# Patient Record
Sex: FEMALE | Race: BLACK OR AFRICAN AMERICAN | NOT HISPANIC OR LATINO | URBAN - METROPOLITAN AREA
[De-identification: names, ages, dates, MRNs, and addresses within clinical notes are randomized per-mention and may not be internally consistent; named-entity substitution may affect disease eponyms.]

---

## 2020-07-25 ENCOUNTER — TELEPHONE ENCOUNTER (OUTPATIENT)
Dept: URBAN - METROPOLITAN AREA CLINIC 13 | Facility: CLINIC | Age: 45
End: 2020-07-25

## 2020-07-25 RX ORDER — DICYCLOMINE HYDROCHLORIDE 10 MG/1
TAKE 1 CAPSULE EVERY 6 HOURS AS NEEDED CAPSULE ORAL
Qty: 28 | Refills: 0 | OUTPATIENT
Start: 2018-05-30 | End: 2018-12-06

## 2020-07-25 RX ORDER — POLYETHYLENE GLYCOL 3350, SODIUM CHLORIDE, SODIUM BICARBONATE AND POTASSIUM CHLORIDE WITH LEMON FLAVOR 420; 11.2; 5.72; 1.48 G/4L; G/4L; G/4L; G/4L
USE AS DIRECTED POWDER, FOR SOLUTION ORAL
Qty: 1 | Refills: 0 | OUTPATIENT
Start: 2018-05-30 | End: 2018-07-09

## 2020-07-25 RX ORDER — HYOSCYAMINE SULFATE 0.12 MG/1
TAKE ONE TABLET BY MOUTH EVERY 6 TO 8 HOURS AS NEEDED TABLET ORAL
Qty: 50 | Refills: 1 | OUTPATIENT
Start: 2014-10-30 | End: 2018-03-19

## 2020-07-25 RX ORDER — TOPIRAMATE 50 MG/1
TAKE 1 TABLET TWICE DAILY TABLET, FILM COATED ORAL
Refills: 0 | OUTPATIENT
End: 2018-03-19

## 2020-07-25 RX ORDER — HYOSCYAMINE SULFATE 0.12 MG/1
PLACE 1 TABLET EVERY 4-6 HOURS PRN ABDOMINAL PAIN TABLET, ORALLY DISINTEGRATING ORAL
Qty: 60 | Refills: 2 | OUTPATIENT
Start: 2014-03-13 | End: 2014-10-30

## 2020-07-25 RX ORDER — NAPROXEN 500 MG/1
TAKE 1 TABLET TWICE DAILY AFTER MEALS AS NEEDED TABLET ORAL
Refills: 0 | OUTPATIENT
End: 2014-10-30

## 2020-07-25 RX ORDER — CHOLESTYRAMINE 4 G/9G
MIX THE CONTENTS OF 1 POWDER PACKET WITH 2-6 OZ OF NONCARBONATED BEVERAGE AND SWALLOW TWICE DAILY POWDER, FOR SUSPENSION ORAL
Qty: 60 | Refills: 6 | OUTPATIENT
Start: 2013-11-25 | End: 2014-10-30

## 2020-07-25 RX ORDER — LEVETIRACETAM 500 MG/1
TAKE 1 TABLET TWICE DAILY TABLET ORAL
Refills: 0 | OUTPATIENT
End: 2015-03-17

## 2020-07-25 RX ORDER — DIPHENOXYLATE HYDROCHLORIDE AND ATROPINE SULFATE 2.5; .025 MG/1; MG/1
TAKE TABLET 3 TIMES DAILY PRN TABLET ORAL
Qty: 60 | Refills: 2 | OUTPATIENT
Start: 2013-11-25 | End: 2014-03-13

## 2020-07-25 RX ORDER — MULTIVITAMIN
TAKE 2 CAPSULE DAILY CAPSULE ORAL
Refills: 0 | OUTPATIENT
End: 2015-03-17

## 2020-07-25 RX ORDER — RIFAXIMIN 550 MG/1
TAKE 1 TABLET TWICE DAILY TABLET ORAL
Qty: 28 | Refills: 1 | OUTPATIENT
Start: 2014-03-13 | End: 2014-05-06

## 2020-07-25 RX ORDER — METRONIDAZOLE 500 MG/1
TAKE 1 TABLET 3 TIMES DAILY FOR 10 DAYS TABLET ORAL
Qty: 30 | Refills: 0 | OUTPATIENT
Start: 2013-12-31 | End: 2014-03-13

## 2020-07-26 ENCOUNTER — TELEPHONE ENCOUNTER (OUTPATIENT)
Dept: URBAN - METROPOLITAN AREA CLINIC 13 | Facility: CLINIC | Age: 45
End: 2020-07-26

## 2020-07-26 RX ORDER — ONABOTULINUMTOXINA 100 [USP'U]/1
INJECT ML  1 INJECTION EVERY 3 MONTHS INJECTION, POWDER, LYOPHILIZED, FOR SOLUTION INTRADERMAL; INTRAMUSCULAR
Refills: 0 | Status: ACTIVE | COMMUNITY

## 2020-07-26 RX ORDER — DICYCLOMINE HYDROCHLORIDE 20 MG/1
TABLET ORAL
Qty: 30 | Refills: 0 | Status: ACTIVE | COMMUNITY
Start: 2018-08-27

## 2020-07-26 RX ORDER — VENLAFAXINE HYDROCHLORIDE 75 MG/1
CAPSULE, EXTENDED RELEASE ORAL
Qty: 30 | Refills: 0 | Status: ACTIVE | COMMUNITY
Start: 2019-05-01

## 2020-07-26 RX ORDER — AMITRIPTYLINE HYDROCHLORIDE 25 MG/1
TAKE 1 TABLET AT BEDTIME TABLET, FILM COATED ORAL
Qty: 30 | Refills: 6 | Status: ACTIVE | COMMUNITY
Start: 2016-05-09

## 2020-07-26 RX ORDER — APIXABAN 2.5 MG/1
TAKE TABLET TWICE DAILY TABLET, FILM COATED ORAL
Refills: 0 | Status: ACTIVE | COMMUNITY

## 2020-07-26 RX ORDER — MENTHOL, METHYL SALICYLATE 10; 15 G/100G; G/100G
CREAM TOPICAL
Qty: 85 | Refills: 0 | Status: ACTIVE | COMMUNITY
Start: 2018-04-30

## 2020-07-26 RX ORDER — ESTRADIOL 10 UG/1
TABLET VAGINAL
Qty: 90 | Refills: 0 | Status: ACTIVE | COMMUNITY
Start: 2018-11-05

## 2020-07-26 RX ORDER — FLUCONAZOLE 150 MG/1
TABLET ORAL
Qty: 1 | Refills: 0 | Status: ACTIVE | COMMUNITY
Start: 2019-03-01

## 2020-07-26 RX ORDER — CETIRIZINE HYDROCHLORIDE 10 MG/1
TABLET, FILM COATED ORAL
Qty: 90 | Refills: 0 | Status: ACTIVE | COMMUNITY
Start: 2019-05-23

## 2020-07-26 RX ORDER — FLUTICASONE PROPIONATE 110 UG/1
AEROSOL, METERED RESPIRATORY (INHALATION)
Qty: 10 | Refills: 0 | Status: ACTIVE | COMMUNITY
Start: 2017-09-13

## 2020-07-26 RX ORDER — FEXOFENADINE HCL 180 MG/1
TAKE 1 TABLET TWICE DAILY TABLET ORAL
Refills: 0 | Status: ACTIVE | COMMUNITY

## 2020-07-26 RX ORDER — VALACYCLOVIR HYDROCHLORIDE 500 MG/1
TABLET, FILM COATED ORAL
Qty: 10 | Refills: 0 | Status: ACTIVE | COMMUNITY
Start: 2019-05-23

## 2020-07-26 RX ORDER — FLUCONAZOLE 150 MG/1
TABLET ORAL
Qty: 2 | Refills: 0 | Status: ACTIVE | COMMUNITY
Start: 2018-05-31

## 2020-07-26 RX ORDER — VENLAFAXINE HYDROCHLORIDE 75 MG/1
CAPSULE, EXTENDED RELEASE ORAL
Qty: 30 | Refills: 0 | Status: ACTIVE | COMMUNITY
Start: 2019-05-23

## 2020-07-26 RX ORDER — ELETRIPTAN HYDROBROMIDE 40 MG/1
TAKE 1 TABLET DAILY PRN TABLET, FILM COATED ORAL
Refills: 0 | Status: ACTIVE | COMMUNITY

## 2020-07-26 RX ORDER — FLUCONAZOLE 150 MG/1
TABLET ORAL
Qty: 1 | Refills: 0 | Status: ACTIVE | COMMUNITY
Start: 2018-02-26

## 2020-07-26 RX ORDER — ACETAMINOPHEN AND CODEINE PHOSPHATE 300; 30 MG/1; MG/1
TABLET ORAL
Qty: 20 | Refills: 0 | Status: ACTIVE | COMMUNITY
Start: 2018-12-19

## 2020-07-26 RX ORDER — MUPIROCIN 20 MG/G
OINTMENT TOPICAL
Qty: 22 | Refills: 0 | Status: ACTIVE | COMMUNITY
Start: 2018-06-28

## 2020-07-26 RX ORDER — MENTHOL, METHYL SALICYLATE 10; 15 G/100G; G/100G
CREAM TOPICAL
Qty: 85 | Refills: 0 | Status: ACTIVE | COMMUNITY
Start: 2018-12-19

## 2020-07-26 RX ORDER — VALACYCLOVIR HYDROCHLORIDE 500 MG/1
TABLET, FILM COATED ORAL
Qty: 6 | Refills: 0 | Status: ACTIVE | COMMUNITY
Start: 2018-02-03

## 2020-07-26 RX ORDER — ERENUMAB-AOOE 70 MG/ML
INJECT MG  UAD INJECTION SUBCUTANEOUS
Refills: 0 | Status: ACTIVE | COMMUNITY
Start: 2018-10-18

## 2020-07-26 RX ORDER — DICYCLOMINE HYDROCHLORIDE 10 MG/1
TAKE 1 CAPSULE 3 TIMES DAILY PRN CAPSULE ORAL
Qty: 60 | Refills: 3 | Status: ACTIVE | COMMUNITY
Start: 2018-08-27

## 2020-07-26 RX ORDER — BENZOCAINE AND MENTHOL 15; 3.6 MG/1; MG/1
LOZENGE ORAL
Qty: 18 | Refills: 0 | Status: ACTIVE | COMMUNITY
Start: 2019-05-23

## 2020-07-26 RX ORDER — MELOXICAM 15 MG/1
TABLET ORAL
Qty: 15 | Refills: 0 | Status: ACTIVE | COMMUNITY
Start: 2018-04-17

## 2020-07-26 RX ORDER — MINOCYCLINE HYDROCHLORIDE 50 MG/1
CAPSULE ORAL
Qty: 40 | Refills: 0 | Status: ACTIVE | COMMUNITY
Start: 2018-01-30

## 2020-07-26 RX ORDER — NITROFURANTOIN MONOHYDRATE/MACROCRYSTALLINE 25; 75 MG/1; MG/1
CAPSULE ORAL
Qty: 10 | Refills: 0 | Status: ACTIVE | COMMUNITY
Start: 2019-05-30

## 2020-07-26 RX ORDER — CLINDAMYCIN HYDROCHLORIDE 300 MG/1
CAPSULE ORAL
Qty: 30 | Refills: 0 | Status: ACTIVE | COMMUNITY
Start: 2019-03-01

## 2020-07-26 RX ORDER — OSELTAMIVIR PHOSPHATE 75 MG/1
CAPSULE ORAL
Qty: 10 | Refills: 0 | Status: ACTIVE | COMMUNITY
Start: 2018-01-31

## 2020-07-26 RX ORDER — ONDANSETRON 4 MG/1
TABLET, ORALLY DISINTEGRATING ORAL
Qty: 9 | Refills: 0 | Status: ACTIVE | COMMUNITY
Start: 2018-01-31

## 2020-09-14 ENCOUNTER — OFFICE VISIT (OUTPATIENT)
Dept: URBAN - METROPOLITAN AREA CLINIC 113 | Facility: CLINIC | Age: 45
End: 2020-09-14
Payer: OTHER GOVERNMENT

## 2020-09-14 VITALS
HEIGHT: 65 IN | WEIGHT: 135 LBS | HEART RATE: 68 BPM | TEMPERATURE: 98 F | SYSTOLIC BLOOD PRESSURE: 126 MMHG | DIASTOLIC BLOOD PRESSURE: 88 MMHG | BODY MASS INDEX: 22.49 KG/M2

## 2020-09-14 DIAGNOSIS — K58.0 IRRITABLE BOWEL SYNDROME WITH DIARRHEA: ICD-10-CM

## 2020-09-14 PROCEDURE — 1036F TOBACCO NON-USER: CPT | Performed by: INTERNAL MEDICINE

## 2020-09-14 PROCEDURE — 99214 OFFICE O/P EST MOD 30 MIN: CPT | Performed by: INTERNAL MEDICINE

## 2020-09-14 PROCEDURE — G8427 DOCREV CUR MEDS BY ELIG CLIN: HCPCS | Performed by: INTERNAL MEDICINE

## 2020-09-14 PROCEDURE — G8420 CALC BMI NORM PARAMETERS: HCPCS | Performed by: INTERNAL MEDICINE

## 2020-09-14 RX ORDER — ELETRIPTAN HYDROBROMIDE 40 MG/1
TAKE 1 TABLET DAILY PRN TABLET, FILM COATED ORAL
Refills: 0 | Status: ON HOLD | COMMUNITY

## 2020-09-14 RX ORDER — AMITRIPTYLINE HYDROCHLORIDE 25 MG/1
TAKE 1 TABLET AT BEDTIME TABLET, FILM COATED ORAL
Qty: 30 | Refills: 6 | Status: ACTIVE | COMMUNITY
Start: 2016-05-09

## 2020-09-14 RX ORDER — AMITRIPTYLINE HYDROCHLORIDE 25 MG/1
TAKE 1 TABLET AT BEDTIME TABLET, FILM COATED ORAL ONCE A DAY
Qty: 90 | Refills: 3

## 2020-09-14 RX ORDER — VENLAFAXINE HYDROCHLORIDE 75 MG/1
CAPSULE, EXTENDED RELEASE ORAL
Qty: 30 | Refills: 0 | COMMUNITY
Start: 2019-05-01

## 2020-09-14 RX ORDER — NITROFURANTOIN MONOHYDRATE/MACROCRYSTALLINE 25; 75 MG/1; MG/1
CAPSULE ORAL
Qty: 10 | Refills: 0 | Status: ON HOLD | COMMUNITY
Start: 2019-05-30

## 2020-09-14 RX ORDER — ESTRADIOL 10 UG/1
TABLET VAGINAL
Qty: 90 | Refills: 0 | COMMUNITY
Start: 2018-11-05

## 2020-09-14 RX ORDER — VALACYCLOVIR HYDROCHLORIDE 500 MG/1
TABLET, FILM COATED ORAL
Qty: 6 | Refills: 0 | COMMUNITY
Start: 2018-02-03

## 2020-09-14 RX ORDER — MUPIROCIN 20 MG/G
OINTMENT TOPICAL
Qty: 22 | Refills: 0 | COMMUNITY
Start: 2018-06-28

## 2020-09-14 RX ORDER — MINOCYCLINE HYDROCHLORIDE 50 MG/1
CAPSULE ORAL
Qty: 40 | Refills: 0 | Status: ON HOLD | COMMUNITY
Start: 2018-01-30

## 2020-09-14 RX ORDER — ERENUMAB-AOOE 70 MG/ML
INJECT MG  UAD INJECTION SUBCUTANEOUS
Refills: 0 | COMMUNITY
Start: 2018-10-18

## 2020-09-14 RX ORDER — OSELTAMIVIR PHOSPHATE 75 MG/1
CAPSULE ORAL
Qty: 10 | Refills: 0 | Status: ON HOLD | COMMUNITY
Start: 2018-01-31

## 2020-09-14 RX ORDER — FEXOFENADINE HCL 180 MG/1
TAKE 1 TABLET TWICE DAILY TABLET ORAL
Refills: 0 | Status: ON HOLD | COMMUNITY

## 2020-09-14 RX ORDER — APIXABAN 2.5 MG/1
TAKE TABLET TWICE DAILY TABLET, FILM COATED ORAL
Refills: 0 | Status: ACTIVE | COMMUNITY

## 2020-09-14 RX ORDER — ONDANSETRON 4 MG/1
TABLET, ORALLY DISINTEGRATING ORAL
Qty: 9 | Refills: 0 | Status: ON HOLD | COMMUNITY
Start: 2018-01-31

## 2020-09-14 RX ORDER — MENTHOL, METHYL SALICYLATE 10; 15 G/100G; G/100G
CREAM TOPICAL
Qty: 85 | Refills: 0 | COMMUNITY
Start: 2018-04-30

## 2020-09-14 RX ORDER — LORATADINE 10 MG
1 PACKET MIXED WITH 8 OUNCES OF FLUID TABLET,DISINTEGRATING ORAL ONCE A DAY
Qty: 30 | Refills: 3 | OUTPATIENT
Start: 2020-09-14 | End: 2021-01-11

## 2020-09-14 RX ORDER — ACETAMINOPHEN AND CODEINE PHOSPHATE 300; 30 MG/1; MG/1
TABLET ORAL
Qty: 20 | Refills: 0 | Status: ON HOLD | COMMUNITY
Start: 2018-12-19

## 2020-09-14 RX ORDER — FLUCONAZOLE 150 MG/1
TABLET ORAL
Qty: 1 | Refills: 0 | Status: ON HOLD | COMMUNITY
Start: 2018-02-26

## 2020-09-14 RX ORDER — DICYCLOMINE HYDROCHLORIDE 10 MG/1
TAKE 1 CAPSULE 3 TIMES DAILY PRN CAPSULE ORAL
Qty: 60 | Refills: 3 | Status: ACTIVE | COMMUNITY
Start: 2018-08-27

## 2020-09-14 RX ORDER — FLUTICASONE PROPIONATE 110 UG/1
AEROSOL, METERED RESPIRATORY (INHALATION)
Qty: 10 | Refills: 0 | Status: ON HOLD | COMMUNITY
Start: 2017-09-13

## 2020-09-14 RX ORDER — CETIRIZINE HYDROCHLORIDE 10 MG/1
TABLET, FILM COATED ORAL
Qty: 90 | Refills: 0 | Status: ON HOLD | COMMUNITY
Start: 2019-05-23

## 2020-09-14 RX ORDER — CLINDAMYCIN HYDROCHLORIDE 300 MG/1
CAPSULE ORAL
Qty: 30 | Refills: 0 | Status: ON HOLD | COMMUNITY
Start: 2019-03-01

## 2020-09-14 RX ORDER — ONABOTULINUMTOXINA 100 [USP'U]/1
INJECT ML  1 INJECTION EVERY 3 MONTHS INJECTION, POWDER, LYOPHILIZED, FOR SOLUTION INTRADERMAL; INTRAMUSCULAR
Refills: 0 | Status: ON HOLD | COMMUNITY

## 2020-09-14 RX ORDER — MELOXICAM 15 MG/1
TABLET ORAL
Qty: 15 | Refills: 0 | COMMUNITY
Start: 2018-04-17

## 2020-09-14 RX ORDER — DICYCLOMINE HYDROCHLORIDE 10 MG/1
TAKE 1 CAPSULE 3 TIMES DAILY PRN CAPSULE ORAL
OUTPATIENT
Start: 2018-08-27

## 2020-09-14 RX ORDER — BENZOCAINE AND MENTHOL 15; 3.6 MG/1; MG/1
LOZENGE ORAL
Qty: 18 | Refills: 0 | Status: ON HOLD | COMMUNITY
Start: 2019-05-23

## 2020-09-14 RX ORDER — DICYCLOMINE HYDROCHLORIDE 20 MG/1
TABLET ORAL
Qty: 30 | Refills: 0 | Status: ON HOLD | COMMUNITY
Start: 2018-08-27

## 2020-09-14 NOTE — PHYSICAL EXAM GASTROINTESTINAL
Abdomen,  soft, diffusely tender without rebound or guarding, nondistended,  no rebound or guarding,  no masses palpable,  normal bowel sounds,  Liver and Spleen,  no hepatomegaly present,  no splenomegaly.

## 2020-09-14 NOTE — HPI-TODAY'S VISIT:
Patient returns today in follow-up.  She has a history of irritable bowel syndrome.  She reports she is been having worsening pain symptoms recently.  She was in the ER twice in the last month.  She had a CT scan and lab work apparently done at [facility].  I do not have those results.  She states that there was some kind of "blockage".  She was told to take MiraLAX.  She denies any fevers or chills.  No blood in the stool.  Pain is crampy but diffuse.  Seems to be more constant.  She has not been taking her amitriptyline.  She has been taking the Bentyl since her ER visit.  She was told to take MiraLAX although has not been using it consistently.  Patient with irritable bowel symptoms.  Needs to be on daily MiraLAX

## 2020-11-05 ENCOUNTER — OFFICE VISIT (OUTPATIENT)
Dept: URBAN - METROPOLITAN AREA CLINIC 113 | Facility: CLINIC | Age: 45
End: 2020-11-05
Payer: OTHER GOVERNMENT

## 2020-11-05 VITALS
WEIGHT: 136 LBS | BODY MASS INDEX: 22.66 KG/M2 | HEART RATE: 90 BPM | RESPIRATION RATE: 18 BRPM | SYSTOLIC BLOOD PRESSURE: 118 MMHG | HEIGHT: 65 IN | DIASTOLIC BLOOD PRESSURE: 82 MMHG | TEMPERATURE: 97.9 F

## 2020-11-05 DIAGNOSIS — K58.0 IRRITABLE BOWEL SYNDROME WITH DIARRHEA: ICD-10-CM

## 2020-11-05 PROCEDURE — 1036F TOBACCO NON-USER: CPT | Performed by: INTERNAL MEDICINE

## 2020-11-05 PROCEDURE — G8427 DOCREV CUR MEDS BY ELIG CLIN: HCPCS | Performed by: INTERNAL MEDICINE

## 2020-11-05 PROCEDURE — G8420 CALC BMI NORM PARAMETERS: HCPCS | Performed by: INTERNAL MEDICINE

## 2020-11-05 PROCEDURE — 99213 OFFICE O/P EST LOW 20 MIN: CPT | Performed by: INTERNAL MEDICINE

## 2020-11-05 PROCEDURE — G8483 FLU IMM NO ADMIN DOC REA: HCPCS | Performed by: INTERNAL MEDICINE

## 2020-11-05 RX ORDER — MINOCYCLINE HYDROCHLORIDE 50 MG/1
CAPSULE ORAL
Qty: 40 | Refills: 0 | Status: DISCONTINUED | COMMUNITY
Start: 2018-01-30

## 2020-11-05 RX ORDER — OSELTAMIVIR PHOSPHATE 75 MG/1
CAPSULE ORAL
Qty: 10 | Refills: 0 | Status: DISCONTINUED | COMMUNITY
Start: 2018-01-31

## 2020-11-05 RX ORDER — DICYCLOMINE HYDROCHLORIDE 10 MG/1
TAKE 1 CAPSULE 3 TIMES DAILY PRN CAPSULE ORAL
Status: ACTIVE | COMMUNITY
Start: 2018-08-27

## 2020-11-05 RX ORDER — CLINDAMYCIN HYDROCHLORIDE 300 MG/1
CAPSULE ORAL
Qty: 30 | Refills: 0 | Status: DISCONTINUED | COMMUNITY
Start: 2019-03-01

## 2020-11-05 RX ORDER — ONDANSETRON 4 MG/1
TABLET, ORALLY DISINTEGRATING ORAL
Qty: 9 | Refills: 0 | Status: DISCONTINUED | COMMUNITY
Start: 2018-01-31

## 2020-11-05 RX ORDER — ONABOTULINUMTOXINA 100 [USP'U]/1
INJECT ML  1 INJECTION EVERY 3 MONTHS INJECTION, POWDER, LYOPHILIZED, FOR SOLUTION INTRADERMAL; INTRAMUSCULAR
Refills: 0 | Status: DISCONTINUED | COMMUNITY

## 2020-11-05 RX ORDER — AMITRIPTYLINE HYDROCHLORIDE 25 MG/1
TAKE 1 TABLET AT BEDTIME TABLET, FILM COATED ORAL ONCE A DAY
Qty: 90 | Refills: 3 | Status: ACTIVE | COMMUNITY

## 2020-11-05 RX ORDER — MENTHOL, METHYL SALICYLATE 10; 15 G/100G; G/100G
CREAM TOPICAL
Qty: 85 | Refills: 0 | Status: DISCONTINUED | COMMUNITY
Start: 2018-04-30

## 2020-11-05 RX ORDER — LORATADINE 10 MG
1 PACKET MIXED WITH 8 OUNCES OF FLUID TABLET,DISINTEGRATING ORAL ONCE A DAY
Qty: 30 | Refills: 3 | Status: ACTIVE | COMMUNITY
Start: 2020-09-14 | End: 2021-01-11

## 2020-11-05 RX ORDER — ESTRADIOL 10 UG/1
TABLET VAGINAL
Qty: 90 | Refills: 0 | Status: DISCONTINUED | COMMUNITY
Start: 2018-11-05

## 2020-11-05 RX ORDER — NITROFURANTOIN MONOHYDRATE/MACROCRYSTALLINE 25; 75 MG/1; MG/1
CAPSULE ORAL
Qty: 10 | Refills: 0 | Status: DISCONTINUED | COMMUNITY
Start: 2019-05-30

## 2020-11-05 RX ORDER — AMITRIPTYLINE HYDROCHLORIDE 25 MG/1
TAKE 1 TABLET AT BEDTIME TABLET, FILM COATED ORAL ONCE A DAY
OUTPATIENT

## 2020-11-05 RX ORDER — DICYCLOMINE HYDROCHLORIDE 20 MG/1
TABLET ORAL
Qty: 30 | Refills: 0 | Status: DISCONTINUED | COMMUNITY
Start: 2018-08-27

## 2020-11-05 RX ORDER — FLUTICASONE PROPIONATE 110 UG/1
AEROSOL, METERED RESPIRATORY (INHALATION)
Qty: 10 | Refills: 0 | Status: DISCONTINUED | COMMUNITY
Start: 2017-09-13

## 2020-11-05 RX ORDER — ERENUMAB-AOOE 70 MG/ML
INJECT MG  UAD INJECTION SUBCUTANEOUS
Refills: 0 | Status: ACTIVE | COMMUNITY
Start: 2018-10-18

## 2020-11-05 RX ORDER — APIXABAN 2.5 MG/1
TAKE TABLET TWICE DAILY TABLET, FILM COATED ORAL
Refills: 0 | Status: ACTIVE | COMMUNITY

## 2020-11-05 RX ORDER — VENLAFAXINE HYDROCHLORIDE 75 MG/1
CAPSULE, EXTENDED RELEASE ORAL
Qty: 30 | Refills: 0 | Status: DISCONTINUED | COMMUNITY
Start: 2019-05-01

## 2020-11-05 RX ORDER — ELETRIPTAN HYDROBROMIDE 40 MG/1
TAKE 1 TABLET DAILY PRN TABLET, FILM COATED ORAL
Refills: 0 | Status: DISCONTINUED | COMMUNITY

## 2020-11-05 RX ORDER — LORATADINE 10 MG
1 PACKET MIXED WITH 8 OUNCES OF FLUID TABLET,DISINTEGRATING ORAL ONCE A DAY
OUTPATIENT
Start: 2020-09-14 | End: 2021-01-11

## 2020-11-05 RX ORDER — VALACYCLOVIR HYDROCHLORIDE 500 MG/1
TABLET, FILM COATED ORAL
Qty: 6 | Refills: 0 | Status: DISCONTINUED | COMMUNITY
Start: 2018-02-03

## 2020-11-05 RX ORDER — ACETAMINOPHEN AND CODEINE PHOSPHATE 300; 30 MG/1; MG/1
TABLET ORAL
Qty: 20 | Refills: 0 | Status: DISCONTINUED | COMMUNITY
Start: 2018-12-19

## 2020-11-05 RX ORDER — FEXOFENADINE HCL 180 MG/1
TAKE 1 TABLET TWICE DAILY TABLET ORAL
Refills: 0 | Status: DISCONTINUED | COMMUNITY

## 2020-11-05 RX ORDER — BENZOCAINE AND MENTHOL 15; 3.6 MG/1; MG/1
LOZENGE ORAL
Qty: 18 | Refills: 0 | Status: DISCONTINUED | COMMUNITY
Start: 2019-05-23

## 2020-11-05 RX ORDER — MUPIROCIN 20 MG/G
OINTMENT TOPICAL
Qty: 22 | Refills: 0 | Status: DISCONTINUED | COMMUNITY
Start: 2018-06-28

## 2020-11-05 RX ORDER — FLUCONAZOLE 150 MG/1
TABLET ORAL
Qty: 1 | Refills: 0 | Status: DISCONTINUED | COMMUNITY
Start: 2018-02-26

## 2020-11-05 RX ORDER — MELOXICAM 15 MG/1
TABLET ORAL
Qty: 15 | Refills: 0 | Status: DISCONTINUED | COMMUNITY
Start: 2018-04-17

## 2020-11-05 RX ORDER — CETIRIZINE HYDROCHLORIDE 10 MG/1
TABLET, FILM COATED ORAL
Qty: 90 | Refills: 0 | Status: DISCONTINUED | COMMUNITY
Start: 2019-05-23

## 2020-11-05 RX ORDER — DICYCLOMINE HYDROCHLORIDE 10 MG/1
TAKE 1 CAPSULE 3 TIMES DAILY PRN CAPSULE ORAL
OUTPATIENT
Start: 2018-08-27

## 2020-11-05 NOTE — HPI-TODAY'S VISIT:
Patient returns today in follow-up.  She reports she is doing much better.  She was started on amitriptyline on her last visit.  Her pain is better.  She is needing Bentyl very infrequently.  She is having more regular bowel movements by using MiraLAX daily.  Generally has 1 or 2 bowel movements per day.  Not hard.  No straining.  Denies any fevers or chills.  No other new complaints

## 2021-03-05 ENCOUNTER — OFFICE VISIT (OUTPATIENT)
Dept: URBAN - METROPOLITAN AREA CLINIC 113 | Facility: CLINIC | Age: 46
End: 2021-03-05

## 2021-03-15 ENCOUNTER — OFFICE VISIT (OUTPATIENT)
Dept: URBAN - METROPOLITAN AREA CLINIC 107 | Facility: CLINIC | Age: 46
End: 2021-03-15
Payer: OTHER GOVERNMENT

## 2021-03-15 VITALS
WEIGHT: 140 LBS | DIASTOLIC BLOOD PRESSURE: 72 MMHG | SYSTOLIC BLOOD PRESSURE: 114 MMHG | HEIGHT: 65 IN | OXYGEN SATURATION: 98 % | TEMPERATURE: 98.3 F | HEART RATE: 98 BPM | BODY MASS INDEX: 23.32 KG/M2

## 2021-03-15 DIAGNOSIS — K58.0 IRRITABLE BOWEL SYNDROME WITH DIARRHEA: ICD-10-CM

## 2021-03-15 PROCEDURE — 99213 OFFICE O/P EST LOW 20 MIN: CPT | Performed by: NURSE PRACTITIONER

## 2021-03-15 RX ORDER — ERENUMAB-AOOE 70 MG/ML
INJECT MG  UAD INJECTION SUBCUTANEOUS
Refills: 0 | Status: ACTIVE | COMMUNITY
Start: 2018-10-18

## 2021-03-15 RX ORDER — APIXABAN 2.5 MG/1
TAKE TABLET TWICE DAILY TABLET, FILM COATED ORAL
Refills: 0 | Status: ACTIVE | COMMUNITY

## 2021-03-15 RX ORDER — AMITRIPTYLINE HYDROCHLORIDE 25 MG/1
TAKE 1 TABLET AT BEDTIME TABLET, FILM COATED ORAL ONCE A DAY
OUTPATIENT

## 2021-03-15 RX ORDER — DICYCLOMINE HYDROCHLORIDE 10 MG/1
TAKE 1 CAPSULE 3 TIMES DAILY PRN CAPSULE ORAL
Status: ACTIVE | COMMUNITY
Start: 2018-08-27

## 2021-03-15 RX ORDER — AMITRIPTYLINE HYDROCHLORIDE 25 MG/1
TAKE 1 TABLET AT BEDTIME TABLET, FILM COATED ORAL ONCE A DAY
Status: ACTIVE | COMMUNITY

## 2021-03-15 NOTE — HPI-TODAY'S VISIT:
This is a 45-year-old woman with a history of irritable bowel syndrome with diarrhea and abdominal pain, presenting for follow-up. She was last seen in the office on 11/5/2020 in follow-up for irritable bowel syndrome and abdominal pain.  She had been started on amitriptyline 25 mg daily at bedtime with significant improvement in her symptoms.  Her bowels were moving fairly regularly using MiraLAX daily, and required dicyclomine infrequently.  She was recommended to continue amitriptyline for 3 months and then stop with plans to follow-up 1 month after stopping this to see if it would be needed long-term. She reports drowsiness lasting over half of the day associated with using amitriptyline at bedtime.  She did not stop this as previously recommended.  Abdominal pain remains unchanged, only occurring dietary indiscretions such as cabbage.  Bowels are moving daily without blood per rectum.  Appetite is fair, weight is stable.

## 2021-04-26 ENCOUNTER — OFFICE VISIT (OUTPATIENT)
Dept: URBAN - METROPOLITAN AREA CLINIC 107 | Facility: CLINIC | Age: 46
End: 2021-04-26

## 2021-04-26 RX ORDER — APIXABAN 2.5 MG/1
TAKE TABLET TWICE DAILY TABLET, FILM COATED ORAL
Refills: 0 | Status: ACTIVE | COMMUNITY

## 2021-04-26 RX ORDER — DICYCLOMINE HYDROCHLORIDE 10 MG/1
TAKE 1 CAPSULE 3 TIMES DAILY PRN CAPSULE ORAL
Status: ACTIVE | COMMUNITY
Start: 2018-08-27

## 2021-04-26 RX ORDER — ERENUMAB-AOOE 70 MG/ML
INJECT MG  UAD INJECTION SUBCUTANEOUS
Refills: 0 | Status: ACTIVE | COMMUNITY
Start: 2018-10-18

## 2021-04-26 NOTE — HPI-TODAY'S VISIT:
45-year-old woman with a history of irritable bowel syndrome with diarrhea and abdominal pain, presenting for 6-week follow-up. She was last seen in the office on 3/15/2021.  She was without any symptoms regarding IBS or abdominal pain.  She did not stop amitriptyline as previously recommended so she was encouraged to stop that medication at that visit.  She was planned to see back in the office in 6 weeks to determine long-term need for amitriptyline.  Given her complaints of drowsiness, resumption of amitriptyline at a lower dose was to be considered.

## 2021-05-27 ENCOUNTER — OFFICE VISIT (OUTPATIENT)
Dept: URBAN - METROPOLITAN AREA CLINIC 113 | Facility: CLINIC | Age: 46
End: 2021-05-27

## 2021-06-18 ENCOUNTER — OFFICE VISIT (OUTPATIENT)
Dept: URBAN - METROPOLITAN AREA CLINIC 113 | Facility: CLINIC | Age: 46
End: 2021-06-18
Payer: OTHER GOVERNMENT

## 2021-06-18 VITALS
WEIGHT: 142 LBS | SYSTOLIC BLOOD PRESSURE: 121 MMHG | DIASTOLIC BLOOD PRESSURE: 86 MMHG | RESPIRATION RATE: 20 BRPM | TEMPERATURE: 98 F | HEIGHT: 65 IN | BODY MASS INDEX: 23.66 KG/M2 | HEART RATE: 81 BPM

## 2021-06-18 DIAGNOSIS — K58.0 IRRITABLE BOWEL SYNDROME WITH DIARRHEA: ICD-10-CM

## 2021-06-18 PROCEDURE — 99213 OFFICE O/P EST LOW 20 MIN: CPT | Performed by: INTERNAL MEDICINE

## 2021-06-18 RX ORDER — ERENUMAB-AOOE 70 MG/ML
INJECT MG  UAD INJECTION SUBCUTANEOUS
Refills: 0 | Status: ACTIVE | COMMUNITY
Start: 2018-10-18

## 2021-06-18 RX ORDER — APIXABAN 2.5 MG/1
TAKE TABLET TWICE DAILY TABLET, FILM COATED ORAL
Refills: 0 | Status: ACTIVE | COMMUNITY

## 2021-06-18 RX ORDER — DICYCLOMINE HYDROCHLORIDE 10 MG/1
TAKE 1 CAPSULE 3 TIMES DAILY PRN CAPSULE ORAL
Status: ACTIVE | COMMUNITY
Start: 2018-08-27

## 2021-06-18 RX ORDER — AMITRIPTYLINE HYDROCHLORIDE 10 MG/1
1 TABLET AT BEDTIME TABLET, FILM COATED ORAL ONCE A DAY
Qty: 90 | Refills: 3 | OUTPATIENT
Start: 2021-06-18

## 2021-06-18 NOTE — HPI-TODAY'S VISIT:
46-year-old female with a past medical history of irritable bowel syndrome with diarrhea and abdominal pain, presenting for follow-up. She was last seen in the office on 3/15/2021.  She reported drowsiness lasting over half of the day associated with use of amitriptyline at bedtime.  She had not stopped this as previously recommended.  Her abdominal pain remained unchanged, only occurring with dietary indiscretion such as eating cabbage.  Her weight was stable.  She was recommended to stop amitriptyline to determine need for long-term use of medication.  Given her complaints of drowsiness, resumption at lower dose was a consideration.  She reports feeling "so-so." Over the last couple of weeks, she has been having increased abdominal cramping and diarrhea. Cramping is located in the lower abdomen. This is interfering with sleep.  She was certainly better on amitriptyline. No nausea or vomiting. No blood per rectum. Dicyclomine helps with cramping when used. She is not on faiber supplement. She is supposed to be taking Metamucil but is not. She has not tried Imodium.

## 2021-09-27 ENCOUNTER — OFFICE VISIT (OUTPATIENT)
Dept: URBAN - METROPOLITAN AREA CLINIC 113 | Facility: CLINIC | Age: 46
End: 2021-09-27

## 2021-11-23 ENCOUNTER — OFFICE VISIT (OUTPATIENT)
Dept: URBAN - METROPOLITAN AREA CLINIC 113 | Facility: CLINIC | Age: 46
End: 2021-11-23
Payer: OTHER GOVERNMENT

## 2021-11-23 VITALS
DIASTOLIC BLOOD PRESSURE: 80 MMHG | BODY MASS INDEX: 23.82 KG/M2 | HEART RATE: 89 BPM | TEMPERATURE: 97.5 F | HEIGHT: 65 IN | SYSTOLIC BLOOD PRESSURE: 115 MMHG | WEIGHT: 143 LBS

## 2021-11-23 DIAGNOSIS — K58.0 IRRITABLE BOWEL SYNDROME WITH DIARRHEA: ICD-10-CM

## 2021-11-23 PROCEDURE — 99213 OFFICE O/P EST LOW 20 MIN: CPT | Performed by: INTERNAL MEDICINE

## 2021-11-23 RX ORDER — ERENUMAB-AOOE 70 MG/ML
INJECT MG  UAD INJECTION SUBCUTANEOUS
Refills: 0 | Status: ACTIVE | COMMUNITY
Start: 2018-10-18

## 2021-11-23 RX ORDER — DICYCLOMINE HYDROCHLORIDE 10 MG/1
TAKE 1 CAPSULE 3 TIMES DAILY PRN CAPSULE ORAL
Status: ACTIVE | COMMUNITY
Start: 2018-08-27

## 2021-11-23 RX ORDER — DICYCLOMINE HYDROCHLORIDE 10 MG/1
TAKE 1 CAPSULE 3 TIMES DAILY PRN CAPSULE ORAL
OUTPATIENT
Start: 2018-08-27

## 2021-11-23 RX ORDER — APIXABAN 2.5 MG/1
TAKE TABLET TWICE DAILY TABLET, FILM COATED ORAL
Refills: 0 | Status: ACTIVE | COMMUNITY

## 2021-11-23 RX ORDER — AMITRIPTYLINE HYDROCHLORIDE 10 MG/1
1 TABLET AT BEDTIME TABLET, FILM COATED ORAL ONCE A DAY
Qty: 90 | Refills: 3 | Status: ACTIVE | COMMUNITY
Start: 2021-06-18

## 2021-11-23 RX ORDER — AMITRIPTYLINE HYDROCHLORIDE 10 MG/1
1 TABLET AT BEDTIME TABLET, FILM COATED ORAL ONCE A DAY
OUTPATIENT
Start: 2021-06-18

## 2021-11-23 NOTE — HPI-TODAY'S VISIT:
Patient turns today in follow-up.  She is doing well.  Amitriptyline has been helpful.  She denies any chest pain or shortness of breath.  Little abdominal pain.  Improved with Bentyl.  Generally has 2 bowel movements per day.  Occasionally will have some worsening symptoms when she indulges with certain foods.

## 2022-07-14 ENCOUNTER — OFFICE VISIT (OUTPATIENT)
Dept: URBAN - METROPOLITAN AREA CLINIC 113 | Facility: CLINIC | Age: 47
End: 2022-07-14
Payer: OTHER GOVERNMENT

## 2022-07-14 VITALS
HEART RATE: 69 BPM | SYSTOLIC BLOOD PRESSURE: 133 MMHG | TEMPERATURE: 97.8 F | DIASTOLIC BLOOD PRESSURE: 96 MMHG | WEIGHT: 145 LBS | BODY MASS INDEX: 24.16 KG/M2 | HEIGHT: 65 IN

## 2022-07-14 DIAGNOSIS — K58.0 IRRITABLE BOWEL SYNDROME WITH DIARRHEA: ICD-10-CM

## 2022-07-14 DIAGNOSIS — R10.30 LOWER ABDOMINAL PAIN: ICD-10-CM

## 2022-07-14 PROCEDURE — 99214 OFFICE O/P EST MOD 30 MIN: CPT | Performed by: INTERNAL MEDICINE

## 2022-07-14 RX ORDER — ERENUMAB-AOOE 70 MG/ML
INJECT MG  UAD INJECTION SUBCUTANEOUS
Refills: 0 | Status: ACTIVE | COMMUNITY
Start: 2018-10-18

## 2022-07-14 RX ORDER — AMITRIPTYLINE HYDROCHLORIDE 10 MG/1
1 TABLET AT BEDTIME TABLET, FILM COATED ORAL ONCE A DAY
Status: ACTIVE | COMMUNITY
Start: 2021-06-18

## 2022-07-14 RX ORDER — APIXABAN 2.5 MG/1
TAKE TABLET TWICE DAILY TABLET, FILM COATED ORAL
Refills: 0 | Status: ACTIVE | COMMUNITY

## 2022-07-14 RX ORDER — DICYCLOMINE HYDROCHLORIDE 10 MG/1
TAKE 1 CAPSULE 3 TIMES DAILY PRN CAPSULE ORAL
Status: ON HOLD | COMMUNITY
Start: 2018-08-27

## 2022-07-14 RX ORDER — RIFAXIMIN 550 MG/1
1 TABLET TABLET ORAL THREE TIMES A DAY
Qty: 42 TABLET | Refills: 0 | OUTPATIENT
Start: 2022-07-14 | End: 2022-07-28

## 2022-07-14 NOTE — HPI-OTHER HISTORIES
Colonoscopy 7/9/2018: Performed without difficulty. BBPS score of 6. Patent end-to-end colo colonic anastamosis with healthy appearing mucosa. Otherwise normal and no specimens were collected. Repeat in 10 years.

## 2022-07-14 NOTE — HPI-TODAY'S VISIT:
47 year old female with a history of endometriosis, rectal laceration during hysterectomy requiring partial colectomy, and IBS-D presents for evaluation of abdominal pain. She was last seen on 11/23/2021 for followup regarding IBS. Symptoms were well controlled with Amitriptyline and Dicyclomine as needed. She was recommended to follow-up in 6 months. She has been lost to follow up ever since.  Patient reports worsening IBS symptoms for the last 3-4 months. She has daily abdominal cramping. She is having up to 5 loose stools daily. She is no longer on Benefiber as she did not like the taste and texture when sprinkled over her food. She has not tried mixing it into her drink. She does have colestipol at home which helps however causes constipation for three days even after one tablet. However, she forgets to take it before she eats. It does cause constipation at times. She stopped taking Bentyl as it interfered with her seizure medication.   She did travel to the [facility] in June on a cruise however her diarrhea was ongoing prior to this. She denies recent abx use or hospitalization. Denies sick contacts. SHe denies blood per rectum or melena.

## 2022-07-15 ENCOUNTER — OFFICE VISIT (OUTPATIENT)
Dept: URBAN - METROPOLITAN AREA CLINIC 107 | Facility: CLINIC | Age: 47
End: 2022-07-15

## 2022-09-26 ENCOUNTER — OFFICE VISIT (OUTPATIENT)
Dept: URBAN - METROPOLITAN AREA CLINIC 113 | Facility: CLINIC | Age: 47
End: 2022-09-26
Payer: OTHER GOVERNMENT

## 2022-09-26 ENCOUNTER — LAB OUTSIDE AN ENCOUNTER (OUTPATIENT)
Dept: URBAN - METROPOLITAN AREA CLINIC 113 | Facility: CLINIC | Age: 47
End: 2022-09-26

## 2022-09-26 VITALS
RESPIRATION RATE: 20 BRPM | TEMPERATURE: 98 F | SYSTOLIC BLOOD PRESSURE: 105 MMHG | HEIGHT: 65 IN | DIASTOLIC BLOOD PRESSURE: 74 MMHG | WEIGHT: 148 LBS | HEART RATE: 88 BPM | BODY MASS INDEX: 24.66 KG/M2

## 2022-09-26 DIAGNOSIS — K58.0 IRRITABLE BOWEL SYNDROME WITH DIARRHEA: ICD-10-CM

## 2022-09-26 DIAGNOSIS — R10.84 GENERALIZED ABDOMINAL PAIN: ICD-10-CM

## 2022-09-26 PROCEDURE — 99214 OFFICE O/P EST MOD 30 MIN: CPT | Performed by: INTERNAL MEDICINE

## 2022-09-26 RX ORDER — ERENUMAB-AOOE 70 MG/ML
INJECT MG  UAD INJECTION SUBCUTANEOUS
Refills: 0 | Status: ACTIVE | COMMUNITY
Start: 2018-10-18

## 2022-09-26 RX ORDER — APIXABAN 2.5 MG/1
TAKE TABLET TWICE DAILY TABLET, FILM COATED ORAL
Refills: 0 | Status: ACTIVE | COMMUNITY

## 2022-09-26 RX ORDER — RIFAXIMIN 550 MG/1
1 TABLET TABLET ORAL THREE TIMES A DAY
Qty: 42 TABLET | Refills: 1 | OUTPATIENT
Start: 2022-09-26 | End: 2022-10-24

## 2022-09-26 RX ORDER — AMITRIPTYLINE HYDROCHLORIDE 10 MG/1
1 TABLET AT BEDTIME TABLET, FILM COATED ORAL ONCE A DAY
Status: ACTIVE | COMMUNITY
Start: 2021-06-18

## 2022-09-26 RX ORDER — DICYCLOMINE HYDROCHLORIDE 10 MG/1
TAKE 1 CAPSULE 3 TIMES DAILY PRN CAPSULE ORAL
Status: ON HOLD | COMMUNITY
Start: 2018-08-27

## 2022-09-26 RX ORDER — AMITRIPTYLINE HYDROCHLORIDE 10 MG/1
1 TABLET AT BEDTIME TABLET, FILM COATED ORAL ONCE A DAY
OUTPATIENT
Start: 2021-06-18

## 2022-09-26 NOTE — HPI-TODAY'S VISIT:
Patient presents today in follow-up.  She has a history of irritable bowel syndrome diarrhea predominant as well as abdominal pain.  She was given Xifaxan at her last visit but was not able to get it filled as they did not have it on post.  She reports alternating bowel movement still.  She has abdominal pain which seems to be more postprandial in nature.  Without being said she is not losing weight.  There is no blood in the stool.  She is unable to take dicyclomine secondary to interactions with her seizure medication.  She is on amitriptyline at bedtime.  She takes colestipol as needed for loose stools.  She denies any jaundice or dark urine.  No other precipitating factors noted.

## 2022-09-27 LAB
A/G RATIO: 1.5
ABSOLUTE BASOPHILS: 59
ABSOLUTE EOSINOPHILS: 151
ABSOLUTE LYMPHOCYTES: 2020
ABSOLUTE MONOCYTES: 529
ABSOLUTE NEUTROPHILS: 2641
ALBUMIN: 4.1
ALKALINE PHOSPHATASE: 105
ALT (SGPT): 10
AMYLASE: 46
AST (SGOT): 15
BASOPHILS: 1.1
BILIRUBIN, TOTAL: 0.7
BUN/CREATININE RATIO: (no result)
BUN: 9
CALCIUM: 9.3
CARBON DIOXIDE, TOTAL: 26
CHLORIDE: 105
CREATININE: 0.79
EGFR: 94
EOSINOPHILS: 2.8
GLOBULIN, TOTAL: 2.8
GLUCOSE: 85
HEMATOCRIT: 38.2
HEMOGLOBIN: 12.3
LIPASE: 25
LYMPHOCYTES: 37.4
MCH: 28.1
MCHC: 32.2
MCV: 87.4
MONOCYTES: 9.8
MPV: 10.6
NEUTROPHILS: 48.9
PLATELET COUNT: 290
POTASSIUM: 3.5
PROTEIN, TOTAL: 6.9
RDW: 13.5
RED BLOOD CELL COUNT: 4.37
SODIUM: 141
WHITE BLOOD CELL COUNT: 5.4

## 2022-10-24 ENCOUNTER — TELEPHONE ENCOUNTER (OUTPATIENT)
Dept: URBAN - METROPOLITAN AREA CLINIC 113 | Facility: CLINIC | Age: 47
End: 2022-10-24

## 2022-12-01 ENCOUNTER — OFFICE VISIT (OUTPATIENT)
Dept: URBAN - METROPOLITAN AREA CLINIC 113 | Facility: CLINIC | Age: 47
End: 2022-12-01
Payer: OTHER GOVERNMENT

## 2022-12-01 VITALS
BODY MASS INDEX: 24.32 KG/M2 | DIASTOLIC BLOOD PRESSURE: 82 MMHG | TEMPERATURE: 97.5 F | HEART RATE: 94 BPM | WEIGHT: 146 LBS | HEIGHT: 65 IN | SYSTOLIC BLOOD PRESSURE: 109 MMHG

## 2022-12-01 DIAGNOSIS — R10.84 GENERALIZED ABDOMINAL PAIN: ICD-10-CM

## 2022-12-01 DIAGNOSIS — K58.0 IRRITABLE BOWEL SYNDROME WITH DIARRHEA: ICD-10-CM

## 2022-12-01 PROBLEM — 197125005 IRRITABLE BOWEL SYNDROME WITH DIARRHEA: Status: ACTIVE | Noted: 2020-09-14

## 2022-12-01 PROCEDURE — 99214 OFFICE O/P EST MOD 30 MIN: CPT | Performed by: INTERNAL MEDICINE

## 2022-12-01 RX ORDER — AMITRIPTYLINE HYDROCHLORIDE 10 MG/1
1 TABLET AT BEDTIME TABLET, FILM COATED ORAL ONCE A DAY
Status: ACTIVE | COMMUNITY
Start: 2021-06-18

## 2022-12-01 RX ORDER — ERENUMAB-AOOE 70 MG/ML
INJECT MG  UAD INJECTION SUBCUTANEOUS
Refills: 0 | Status: ACTIVE | COMMUNITY
Start: 2018-10-18

## 2022-12-01 RX ORDER — DICYCLOMINE HYDROCHLORIDE 10 MG/1
TAKE 1 CAPSULE 3 TIMES DAILY PRN CAPSULE ORAL
OUTPATIENT
Start: 2018-08-27

## 2022-12-01 RX ORDER — DICYCLOMINE HYDROCHLORIDE 10 MG/1
TAKE 1 CAPSULE 3 TIMES DAILY PRN CAPSULE ORAL
Status: ACTIVE | COMMUNITY
Start: 2018-08-27

## 2022-12-01 RX ORDER — APIXABAN 2.5 MG/1
TAKE TABLET TWICE DAILY TABLET, FILM COATED ORAL
Refills: 0 | Status: ACTIVE | COMMUNITY

## 2022-12-01 RX ORDER — AMITRIPTYLINE HYDROCHLORIDE 10 MG/1
1 TABLET AT BEDTIME TABLET, FILM COATED ORAL ONCE A DAY
OUTPATIENT
Start: 2021-06-18

## 2022-12-01 NOTE — HPI-TODAY'S VISIT:
Patient returns today in follow-up.  She reports that she is doing well.  Her symptoms are fairly well controlled.  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  She uses dicyclomine as needed.  She is taking the amitriptyline daily.  Overall symptom control seems improved.  She was unable to get the Xifaxan secondary to some insurance issues.  No blood in the stool.  No new complaints.

## 2023-12-13 ENCOUNTER — OFFICE VISIT (OUTPATIENT)
Dept: URBAN - METROPOLITAN AREA CLINIC 113 | Facility: CLINIC | Age: 48
End: 2023-12-13

## 2024-01-02 ENCOUNTER — OFFICE VISIT (OUTPATIENT)
Dept: URBAN - METROPOLITAN AREA CLINIC 113 | Facility: CLINIC | Age: 49
End: 2024-01-02
Payer: OTHER GOVERNMENT

## 2024-01-02 VITALS
WEIGHT: 153 LBS | SYSTOLIC BLOOD PRESSURE: 144 MMHG | RESPIRATION RATE: 16 BRPM | DIASTOLIC BLOOD PRESSURE: 95 MMHG | BODY MASS INDEX: 25.49 KG/M2 | TEMPERATURE: 97.7 F | HEIGHT: 65 IN | HEART RATE: 77 BPM

## 2024-01-02 DIAGNOSIS — K58.0 IRRITABLE BOWEL SYNDROME WITH DIARRHEA: ICD-10-CM

## 2024-01-02 DIAGNOSIS — R10.84 GENERALIZED ABDOMINAL PAIN: ICD-10-CM

## 2024-01-02 PROCEDURE — 99214 OFFICE O/P EST MOD 30 MIN: CPT | Performed by: INTERNAL MEDICINE

## 2024-01-02 RX ORDER — ERENUMAB-AOOE 70 MG/ML
INJECT MG  UAD INJECTION SUBCUTANEOUS
Refills: 0 | Status: ACTIVE | COMMUNITY
Start: 2018-10-18

## 2024-01-02 RX ORDER — DICYCLOMINE HYDROCHLORIDE 10 MG/1
TAKE 1 CAPSULE 3 TIMES DAILY PRN CAPSULE ORAL
Status: ACTIVE | COMMUNITY
Start: 2018-08-27

## 2024-01-02 RX ORDER — AMITRIPTYLINE HYDROCHLORIDE 10 MG/1
1 TABLET AT BEDTIME TABLET, FILM COATED ORAL ONCE A DAY
Status: ACTIVE | COMMUNITY
Start: 2021-06-18

## 2024-01-02 RX ORDER — APIXABAN 2.5 MG/1
TAKE TABLET TWICE DAILY TABLET, FILM COATED ORAL
Refills: 0 | Status: ACTIVE | COMMUNITY

## 2024-01-02 RX ORDER — HYOSCYAMINE SULFATE 0.12 MG/1
1 TABLET UNDER THE TONGUE AND ALLOW TO DISSOLVE TABLET, ORALLY DISINTEGRATING ORAL
Qty: 40 | Refills: 3 | OUTPATIENT
Start: 2024-01-02 | End: 2024-05-01

## 2024-01-02 NOTE — HPI-TODAY'S VISIT:
Patient returns today in follow-up.  She has ongoing abdominal pain.  Lower abdomen bilaterally.  No obvious trigger.  She does report more stress in life.  She is taking her amitriptyline every day.  Reports fluctuating bowel movements.  She has not been on a fiber supplement.  She cannot identify any consistent dietary trigger.

## 2024-03-05 ENCOUNTER — LAB (OUTPATIENT)
Dept: URBAN - METROPOLITAN AREA CLINIC 113 | Facility: CLINIC | Age: 49
End: 2024-03-05

## 2024-03-05 ENCOUNTER — OV EP (OUTPATIENT)
Dept: URBAN - METROPOLITAN AREA CLINIC 113 | Facility: CLINIC | Age: 49
End: 2024-03-05
Payer: OTHER GOVERNMENT

## 2024-03-05 VITALS
SYSTOLIC BLOOD PRESSURE: 141 MMHG | BODY MASS INDEX: 25.16 KG/M2 | RESPIRATION RATE: 16 BRPM | WEIGHT: 151 LBS | HEART RATE: 73 BPM | TEMPERATURE: 97.1 F | DIASTOLIC BLOOD PRESSURE: 98 MMHG | HEIGHT: 65 IN

## 2024-03-05 DIAGNOSIS — K58.0 IRRITABLE BOWEL SYNDROME WITH DIARRHEA: ICD-10-CM

## 2024-03-05 DIAGNOSIS — R10.30 LOWER ABDOMINAL PAIN: ICD-10-CM

## 2024-03-05 PROCEDURE — 99214 OFFICE O/P EST MOD 30 MIN: CPT | Performed by: INTERNAL MEDICINE

## 2024-03-05 RX ORDER — ERENUMAB-AOOE 70 MG/ML
INJECT MG  UAD INJECTION SUBCUTANEOUS
Refills: 0 | Status: ACTIVE | COMMUNITY
Start: 2018-10-18

## 2024-03-05 RX ORDER — HYOSCYAMINE SULFATE 0.12 MG/1
1 TABLET UNDER THE TONGUE AND ALLOW TO DISSOLVE TABLET, ORALLY DISINTEGRATING ORAL
Qty: 40 | Refills: 3 | Status: ACTIVE | COMMUNITY
Start: 2024-01-02 | End: 2024-05-01

## 2024-03-05 RX ORDER — AMITRIPTYLINE HYDROCHLORIDE 10 MG/1
1 TABLET AT BEDTIME TABLET, FILM COATED ORAL ONCE A DAY
Status: ACTIVE | COMMUNITY
Start: 2021-06-18

## 2024-03-05 RX ORDER — DICYCLOMINE HYDROCHLORIDE 10 MG/1
TAKE 1 CAPSULE 3 TIMES DAILY PRN CAPSULE ORAL
Status: ACTIVE | COMMUNITY
Start: 2018-08-27

## 2024-03-05 RX ORDER — APIXABAN 2.5 MG/1
TAKE TABLET TWICE DAILY TABLET, FILM COATED ORAL
Refills: 0 | Status: ACTIVE | COMMUNITY

## 2024-03-05 NOTE — HPI-TODAY'S VISIT:
Patient returns today in follow-up.  She reports ongoing symptoms of abdominal discomfort and bowel movement changes with diarrhea.  She does endorse a lot of stress and feels this is playing the main role.  Levsin or Bentyl can help some although she has not been using it when symptoms first started.  No bleeding.  No fevers.  No weight loss.  She has had a couple really bad episodes recently and is concerned there is more happening.  No other precipitating factors.

## 2024-03-05 NOTE — PHYSICAL EXAM CARDIOVASCULAR:
03/18/20    Kizzy Covarrubias  [address]  [place] [address]      Dear Curly Mora,    [id number] records indicate that you have outstanding lab work and or testing that was ordered for you and has not yet been completed:  Orders Placed This Encounter      CBC W RRR, S1,S2.  No murmurs.  No low extremity edema

## 2024-03-05 NOTE — PHYSICAL EXAM GASTROINTESTINAL
Abdomen is soft, mildly tender bilateral lower quandrants, nondistended , no rebound or guarding. No organomegaly appreciated

## 2024-03-06 LAB
A/G RATIO: 1.5
ABSOLUTE BASOPHILS: 39
ABSOLUTE EOSINOPHILS: 78
ABSOLUTE LYMPHOCYTES: 1950
ABSOLUTE MONOCYTES: 367
ABSOLUTE NEUTROPHILS: 1466
ALBUMIN: 4.1
ALKALINE PHOSPHATASE: 95
ALT (SGPT): 8
AMYLASE: 44
AST (SGOT): 16
BASOPHILS: 1
BILIRUBIN, TOTAL: 0.7
BUN/CREATININE RATIO: (no result)
BUN: 8
CALCIUM: 9.1
CARBON DIOXIDE, TOTAL: 25
CHLORIDE: 103
CREATININE: 0.66
EGFR: 109
EOSINOPHILS: 2
GLOBULIN, TOTAL: 2.8
GLUCOSE: 83
HEMATOCRIT: 37.8
HEMOGLOBIN: 12.4
LIPASE: 33
LYMPHOCYTES: 50
MCH: 28.7
MCHC: 32.8
MCV: 87.5
MONOCYTES: 9.4
MPV: 11
NEUTROPHILS: 37.6
PLATELET COUNT: 279
POTASSIUM: 4.2
PROTEIN, TOTAL: 6.9
RDW: 14.1
RED BLOOD CELL COUNT: 4.32
SODIUM: 139
WHITE BLOOD CELL COUNT: 3.9

## 2024-04-17 ENCOUNTER — OV EP (OUTPATIENT)
Dept: URBAN - METROPOLITAN AREA CLINIC 113 | Facility: CLINIC | Age: 49
End: 2024-04-17
Payer: OTHER GOVERNMENT

## 2024-04-17 VITALS
WEIGHT: 151 LBS | BODY MASS INDEX: 25.16 KG/M2 | HEIGHT: 65 IN | TEMPERATURE: 97.8 F | SYSTOLIC BLOOD PRESSURE: 117 MMHG | RESPIRATION RATE: 16 BRPM | HEART RATE: 79 BPM | DIASTOLIC BLOOD PRESSURE: 89 MMHG

## 2024-04-17 DIAGNOSIS — K58.0 IRRITABLE BOWEL SYNDROME WITH DIARRHEA: ICD-10-CM

## 2024-04-17 DIAGNOSIS — R10.30 LOWER ABDOMINAL PAIN: ICD-10-CM

## 2024-04-17 DIAGNOSIS — R14.0 ABDOMINAL BLOATING: ICD-10-CM

## 2024-04-17 PROCEDURE — 99214 OFFICE O/P EST MOD 30 MIN: CPT | Performed by: INTERNAL MEDICINE

## 2024-04-17 RX ORDER — ERENUMAB-AOOE 70 MG/ML
INJECT MG  UAD INJECTION SUBCUTANEOUS
Refills: 0 | Status: ACTIVE | COMMUNITY
Start: 2018-10-18

## 2024-04-17 RX ORDER — DICYCLOMINE HYDROCHLORIDE 10 MG/1
TAKE 1 CAPSULE 3 TIMES DAILY PRN CAPSULE ORAL
Status: ACTIVE | COMMUNITY
Start: 2018-08-27

## 2024-04-17 RX ORDER — APIXABAN 2.5 MG/1
TAKE TABLET TWICE DAILY TABLET, FILM COATED ORAL
Refills: 0 | Status: ACTIVE | COMMUNITY

## 2024-04-17 RX ORDER — AMITRIPTYLINE HYDROCHLORIDE 10 MG/1
1 TABLET AT BEDTIME TABLET, FILM COATED ORAL ONCE A DAY
Status: ACTIVE | COMMUNITY
Start: 2021-06-18

## 2024-04-17 RX ORDER — HYOSCYAMINE SULFATE 0.12 MG/1
1 TABLET UNDER THE TONGUE AND ALLOW TO DISSOLVE TABLET, ORALLY DISINTEGRATING ORAL
Qty: 40 | Refills: 3 | Status: ACTIVE | COMMUNITY
Start: 2024-01-02 | End: 2024-05-01

## 2024-04-17 NOTE — HPI-TODAY'S VISIT:
Patient returns today in follow-up.  She reports that she is overall doing well.  Describes some bloating at times.  Feels a bit gassy.  Some abdominal discomfort but not severe enough to really require the Levsin.  She seems hesitant to take it.  She is having generally 2 bowel movements per day.  Labs reviewed with her as below which are unremarkable.  She had a CT scan which is also reviewed with her showing no acute process.

## 2024-09-05 ENCOUNTER — OFFICE VISIT (OUTPATIENT)
Dept: URBAN - METROPOLITAN AREA CLINIC 113 | Facility: CLINIC | Age: 49
End: 2024-09-05
Payer: OTHER GOVERNMENT

## 2024-09-05 ENCOUNTER — DASHBOARD ENCOUNTERS (OUTPATIENT)
Age: 49
End: 2024-09-05

## 2024-09-05 VITALS
SYSTOLIC BLOOD PRESSURE: 111 MMHG | DIASTOLIC BLOOD PRESSURE: 86 MMHG | HEIGHT: 65 IN | WEIGHT: 150.2 LBS | TEMPERATURE: 97.7 F | HEART RATE: 80 BPM | BODY MASS INDEX: 25.02 KG/M2 | RESPIRATION RATE: 16 BRPM

## 2024-09-05 DIAGNOSIS — K58.0 IRRITABLE BOWEL SYNDROME WITH DIARRHEA: ICD-10-CM

## 2024-09-05 DIAGNOSIS — R10.84 GENERALIZED ABDOMINAL PAIN: ICD-10-CM

## 2024-09-05 PROCEDURE — 99214 OFFICE O/P EST MOD 30 MIN: CPT | Performed by: NURSE PRACTITIONER

## 2024-09-05 RX ORDER — APIXABAN 2.5 MG/1
TAKE TABLET TWICE DAILY TABLET, FILM COATED ORAL
Refills: 0 | Status: ACTIVE | COMMUNITY

## 2024-09-05 RX ORDER — AMITRIPTYLINE HYDROCHLORIDE 10 MG/1
1 TABLET AT BEDTIME TABLET, FILM COATED ORAL ONCE A DAY
OUTPATIENT
Start: 2021-06-18

## 2024-09-05 RX ORDER — DICYCLOMINE HYDROCHLORIDE 10 MG/1
TAKE 1 CAPSULE 3 TIMES DAILY PRN CAPSULE ORAL
Status: ACTIVE | COMMUNITY
Start: 2018-08-27

## 2024-09-05 RX ORDER — HYOSCYAMINE SULFATE 0.12 MG/1
1 TABLET UNDER THE TONGUE AND ALLOW TO DISSOLVE  AS NEEDED TABLET SUBLINGUAL
Qty: 90 | Refills: 3 | OUTPATIENT
Start: 2024-09-05 | End: 2025-01-02

## 2024-09-05 RX ORDER — AMITRIPTYLINE HYDROCHLORIDE 10 MG/1
1 TABLET AT BEDTIME TABLET, FILM COATED ORAL ONCE A DAY
Status: ACTIVE | COMMUNITY
Start: 2021-06-18

## 2024-09-05 RX ORDER — ERENUMAB-AOOE 70 MG/ML
INJECT MG  UAD INJECTION SUBCUTANEOUS
Refills: 0 | Status: ACTIVE | COMMUNITY
Start: 2018-10-18

## 2024-09-05 NOTE — HPI-TODAY'S VISIT:
50 yo woman with IBS with diarrhea, lower abdominal pain and abdominal bloating, preseting for follow up.  This past Friday, she experienced knot like, cramping sensation that lasted through the weekend. She went to the ER at [place] [facility] [place] for this pain. She tells me that she had a CT performed for these complaints, notable for a UTI and colitis. She was given Cipro and Flagyl to treat these issues. She was also told that she needed MRI of the abdomen to further evaluate CT findings. We do not have a copy of this report. I will have to obtain this. She is taking Cipro and Flagyl. She continues with cramping like, contraction like pains around the umbilicus. The pain is "excruciating" to the point she cannot function. She has tried dicyclomine, without improvement in her pain. She remains on amitriptyline 10 mg at bedtime. Her pain is persistent. She has nausea, but not vomiting. No fever or chills. Her last bowel movement was this mornign around 0730; prior to this she skipped about 3 days without a bowel movement.  Labs on 9/2/24 showed a normal CMP, and CBC. Lipase is normal. UA was notable for an elevated leukocyte esterase.

## 2024-10-21 ENCOUNTER — OFFICE VISIT (OUTPATIENT)
Dept: URBAN - METROPOLITAN AREA CLINIC 113 | Facility: CLINIC | Age: 49
End: 2024-10-21
Payer: OTHER GOVERNMENT

## 2024-10-21 ENCOUNTER — TELEPHONE ENCOUNTER (OUTPATIENT)
Dept: URBAN - METROPOLITAN AREA CLINIC 113 | Facility: CLINIC | Age: 49
End: 2024-10-21

## 2024-10-21 ENCOUNTER — LAB OUTSIDE AN ENCOUNTER (OUTPATIENT)
Dept: URBAN - METROPOLITAN AREA CLINIC 113 | Facility: CLINIC | Age: 49
End: 2024-10-21

## 2024-10-21 VITALS
RESPIRATION RATE: 18 BRPM | BODY MASS INDEX: 25.19 KG/M2 | DIASTOLIC BLOOD PRESSURE: 83 MMHG | HEART RATE: 80 BPM | HEIGHT: 65 IN | TEMPERATURE: 98.1 F | WEIGHT: 151.2 LBS | SYSTOLIC BLOOD PRESSURE: 115 MMHG

## 2024-10-21 DIAGNOSIS — R19.4 CHANGE IN BOWEL HABITS: ICD-10-CM

## 2024-10-21 DIAGNOSIS — R10.84 GENERALIZED ABDOMINAL PAIN: ICD-10-CM

## 2024-10-21 DIAGNOSIS — K58.0 IRRITABLE BOWEL SYNDROME WITH DIARRHEA: ICD-10-CM

## 2024-10-21 PROCEDURE — 99214 OFFICE O/P EST MOD 30 MIN: CPT | Performed by: INTERNAL MEDICINE

## 2024-10-21 RX ORDER — HYOSCYAMINE SULFATE 0.12 MG/1
1 TABLET UNDER THE TONGUE AND ALLOW TO DISSOLVE  AS NEEDED TABLET SUBLINGUAL
Qty: 90 | Refills: 3 | Status: ACTIVE | COMMUNITY
Start: 2024-09-05 | End: 2025-01-02

## 2024-10-21 RX ORDER — ERENUMAB-AOOE 70 MG/ML
INJECT MG  UAD INJECTION SUBCUTANEOUS
Refills: 0 | Status: ACTIVE | COMMUNITY
Start: 2018-10-18

## 2024-10-21 RX ORDER — AMITRIPTYLINE HYDROCHLORIDE 10 MG/1
1 TABLET AT BEDTIME TABLET, FILM COATED ORAL ONCE A DAY
OUTPATIENT
Start: 2021-06-18

## 2024-10-21 RX ORDER — HYOSCYAMINE SULFATE 0.12 MG/1
1 TABLET UNDER THE TONGUE AND ALLOW TO DISSOLVE  AS NEEDED TABLET SUBLINGUAL
OUTPATIENT
Start: 2024-09-05

## 2024-10-21 RX ORDER — DICYCLOMINE HYDROCHLORIDE 10 MG/1
TAKE 1 CAPSULE 3 TIMES DAILY PRN CAPSULE ORAL
Status: ON HOLD | COMMUNITY
Start: 2018-08-27

## 2024-10-21 RX ORDER — APIXABAN 2.5 MG/1
TAKE TABLET TWICE DAILY TABLET, FILM COATED ORAL
Refills: 0 | Status: ACTIVE | COMMUNITY

## 2024-10-21 RX ORDER — AMITRIPTYLINE HYDROCHLORIDE 10 MG/1
1 TABLET AT BEDTIME TABLET, FILM COATED ORAL ONCE A DAY
Status: ACTIVE | COMMUNITY
Start: 2021-06-18

## 2024-10-21 RX ORDER — SODIUM, POTASSIUM,MAG SULFATES 17.5-3.13G
AS DIRECTED SOLUTION, RECONSTITUTED, ORAL ORAL
Qty: 1 | Refills: 0 | OUTPATIENT
Start: 2024-10-21 | End: 2024-10-23

## 2024-10-21 NOTE — HPI-TODAY'S VISIT:
Patient presents today in follow-up.  She reports that her overall pain is better.  She is taking amitriptyline at bedtime.  She is sleeping well.  Only requiring Levsin a few times a month.  Continues have intermittent episodes of's pretty significant diarrhea which is a worsening change for her.  Denies a lot of nocturnal defecation.  Her last colonoscopy was in 2018.  Previous CT scan was negative.  Occasional blood with wiping but no significant blood admixed with stool.

## 2024-10-23 ENCOUNTER — OFFICE VISIT (OUTPATIENT)
Dept: URBAN - METROPOLITAN AREA SURGERY CENTER 25 | Facility: SURGERY CENTER | Age: 49
End: 2024-10-23

## 2024-10-23 ENCOUNTER — CLAIMS CREATED FROM THE CLAIM WINDOW (OUTPATIENT)
Dept: URBAN - METROPOLITAN AREA SURGERY CENTER 25 | Facility: SURGERY CENTER | Age: 49
End: 2024-10-23
Payer: OTHER GOVERNMENT

## 2024-10-23 ENCOUNTER — CLAIMS CREATED FROM THE CLAIM WINDOW (OUTPATIENT)
Dept: URBAN - METROPOLITAN AREA CLINIC 4 | Facility: CLINIC | Age: 49
End: 2024-10-23
Payer: OTHER GOVERNMENT

## 2024-10-23 DIAGNOSIS — K63.89 OTHER SPECIFIED DISEASES OF INTESTINE: ICD-10-CM

## 2024-10-23 DIAGNOSIS — R19.4 CHANGE IN BOWEL HABIT: ICD-10-CM

## 2024-10-23 DIAGNOSIS — R10.84 GENERALIZED ABDOMINAL PAIN: ICD-10-CM

## 2024-10-23 DIAGNOSIS — K57.30 COLON, DIVERTICULOSIS: ICD-10-CM

## 2024-10-23 DIAGNOSIS — Z98.0 INTESTINAL BYPASS AND ANASTOMOSIS STATUS: ICD-10-CM

## 2024-10-23 PROCEDURE — 88313 SPECIAL STAINS GROUP 2: CPT | Performed by: PATHOLOGY

## 2024-10-23 PROCEDURE — 00811 ANES LWR INTST NDSC NOS: CPT | Performed by: NURSE ANESTHETIST, CERTIFIED REGISTERED

## 2024-10-23 PROCEDURE — 88305 TISSUE EXAM BY PATHOLOGIST: CPT | Performed by: PATHOLOGY

## 2024-10-23 PROCEDURE — 88342 IMHCHEM/IMCYTCHM 1ST ANTB: CPT | Performed by: PATHOLOGY

## 2024-10-23 RX ORDER — AMITRIPTYLINE HYDROCHLORIDE 10 MG/1
1 TABLET AT BEDTIME TABLET, FILM COATED ORAL ONCE A DAY
Status: ACTIVE | COMMUNITY
Start: 2021-06-18

## 2024-10-23 RX ORDER — DICYCLOMINE HYDROCHLORIDE 10 MG/1
TAKE 1 CAPSULE 3 TIMES DAILY PRN CAPSULE ORAL
Status: ON HOLD | COMMUNITY
Start: 2018-08-27

## 2024-10-23 RX ORDER — HYOSCYAMINE SULFATE 0.12 MG/1
1 TABLET UNDER THE TONGUE AND ALLOW TO DISSOLVE  AS NEEDED TABLET SUBLINGUAL
Status: ACTIVE | COMMUNITY
Start: 2024-09-05

## 2024-10-23 RX ORDER — ERENUMAB-AOOE 70 MG/ML
INJECT MG  UAD INJECTION SUBCUTANEOUS
Refills: 0 | Status: ACTIVE | COMMUNITY
Start: 2018-10-18

## 2024-10-23 RX ORDER — SODIUM, POTASSIUM,MAG SULFATES 17.5-3.13G
AS DIRECTED SOLUTION, RECONSTITUTED, ORAL ORAL
Qty: 1 | Refills: 0 | Status: ACTIVE | COMMUNITY
Start: 2024-10-21 | End: 2024-10-23

## 2024-10-23 RX ORDER — APIXABAN 2.5 MG/1
TAKE TABLET TWICE DAILY TABLET, FILM COATED ORAL
Refills: 0 | Status: ACTIVE | COMMUNITY

## 2024-12-27 ENCOUNTER — OFFICE VISIT (OUTPATIENT)
Dept: URBAN - METROPOLITAN AREA CLINIC 113 | Facility: CLINIC | Age: 49
End: 2024-12-27
Payer: OTHER GOVERNMENT

## 2024-12-27 VITALS
DIASTOLIC BLOOD PRESSURE: 79 MMHG | HEIGHT: 65 IN | WEIGHT: 150 LBS | HEART RATE: 79 BPM | BODY MASS INDEX: 24.99 KG/M2 | RESPIRATION RATE: 16 BRPM | SYSTOLIC BLOOD PRESSURE: 114 MMHG | TEMPERATURE: 98.1 F

## 2024-12-27 DIAGNOSIS — K58.0 IRRITABLE BOWEL SYNDROME WITH DIARRHEA: ICD-10-CM

## 2024-12-27 DIAGNOSIS — R10.84 GENERALIZED ABDOMINAL PAIN: ICD-10-CM

## 2024-12-27 DIAGNOSIS — R19.4 CHANGE IN BOWEL HABITS: ICD-10-CM

## 2024-12-27 PROCEDURE — 99214 OFFICE O/P EST MOD 30 MIN: CPT | Performed by: INTERNAL MEDICINE

## 2024-12-27 RX ORDER — APIXABAN 2.5 MG/1
TAKE TABLET TWICE DAILY TABLET, FILM COATED ORAL
Refills: 0 | Status: ACTIVE | COMMUNITY

## 2024-12-27 RX ORDER — HYOSCYAMINE SULFATE 0.12 MG/1
1 TABLET UNDER THE TONGUE AND ALLOW TO DISSOLVE  AS NEEDED TABLET SUBLINGUAL
OUTPATIENT
Start: 2024-09-05

## 2024-12-27 RX ORDER — HYOSCYAMINE SULFATE 0.12 MG/1
1 TABLET UNDER THE TONGUE AND ALLOW TO DISSOLVE  AS NEEDED TABLET SUBLINGUAL
Status: ACTIVE | COMMUNITY
Start: 2024-09-05

## 2024-12-27 RX ORDER — AMITRIPTYLINE HYDROCHLORIDE 10 MG/1
1 TABLET AT BEDTIME TABLET, FILM COATED ORAL ONCE A DAY
Status: ACTIVE | COMMUNITY
Start: 2021-06-18

## 2024-12-27 RX ORDER — ERENUMAB-AOOE 70 MG/ML
INJECT MG  UAD INJECTION SUBCUTANEOUS
Refills: 0 | Status: ACTIVE | COMMUNITY
Start: 2018-10-18

## 2024-12-27 RX ORDER — DICYCLOMINE HYDROCHLORIDE 10 MG/1
TAKE 1 CAPSULE 3 TIMES DAILY PRN CAPSULE ORAL
Status: ON HOLD | COMMUNITY
Start: 2018-08-27

## 2024-12-27 RX ORDER — AMITRIPTYLINE HYDROCHLORIDE 10 MG/1
1 TABLET AT BEDTIME TABLET, FILM COATED ORAL ONCE A DAY
OUTPATIENT
Start: 2021-06-18

## 2024-12-27 NOTE — HPI-TODAY'S VISIT:
Patient presents today in follow-up.  She reports she is overall doing well.  Denies any blood in her stool.  Does occasionally have days with loose bowel movements.  This tends to be associated with stress.  She is not requiring Levsin very frequently.  She is taking her amitriptyline every night.  This is helping her sleep.  Overall pain pattern and frequency is markedly improved.  No new complaints.

## 2025-05-22 ENCOUNTER — OFFICE VISIT (OUTPATIENT)
Dept: URBAN - METROPOLITAN AREA CLINIC 113 | Facility: CLINIC | Age: 50
End: 2025-05-22
Payer: OTHER GOVERNMENT

## 2025-05-22 DIAGNOSIS — K58.0 IRRITABLE BOWEL SYNDROME WITH DIARRHEA: ICD-10-CM

## 2025-05-22 DIAGNOSIS — R10.84 GENERALIZED ABDOMINAL PAIN: ICD-10-CM

## 2025-05-22 PROCEDURE — 99214 OFFICE O/P EST MOD 30 MIN: CPT | Performed by: INTERNAL MEDICINE

## 2025-05-22 RX ORDER — AMITRIPTYLINE HYDROCHLORIDE 10 MG/1
1 TABLET AT BEDTIME TABLET, FILM COATED ORAL ONCE A DAY
Status: ACTIVE | COMMUNITY
Start: 2021-06-18

## 2025-05-22 RX ORDER — APIXABAN 2.5 MG/1
TAKE TABLET TWICE DAILY TABLET, FILM COATED ORAL
Refills: 0 | Status: ACTIVE | COMMUNITY

## 2025-05-22 RX ORDER — AMITRIPTYLINE HYDROCHLORIDE 10 MG/1
1 TABLET AT BEDTIME TABLET, FILM COATED ORAL ONCE A DAY
OUTPATIENT
Start: 2021-06-18

## 2025-05-22 RX ORDER — HYOSCYAMINE SULFATE 0.12 MG/1
1 TABLET UNDER THE TONGUE AND ALLOW TO DISSOLVE  AS NEEDED TABLET SUBLINGUAL
OUTPATIENT
Start: 2024-09-05

## 2025-05-22 RX ORDER — DICYCLOMINE HYDROCHLORIDE 10 MG/1
TAKE 1 CAPSULE 3 TIMES DAILY PRN CAPSULE ORAL
Status: ON HOLD | COMMUNITY
Start: 2018-08-27

## 2025-05-22 RX ORDER — HYOSCYAMINE SULFATE 0.12 MG/1
1 TABLET UNDER THE TONGUE AND ALLOW TO DISSOLVE  AS NEEDED TABLET SUBLINGUAL
Status: ACTIVE | COMMUNITY
Start: 2024-09-05

## 2025-05-22 RX ORDER — ERENUMAB-AOOE 70 MG/ML
INJECT MG  UAD INJECTION SUBCUTANEOUS
Refills: 0 | Status: ACTIVE | COMMUNITY
Start: 2018-10-18

## 2025-05-22 NOTE — HPI-TODAY'S VISIT:
Patient presents today in follow-up.  She reports her abdominal pain seems to be pretty well-controlled.  Very infrequently requiring Levsin.  She occasionally is having diarrhea episodes which do respond to an antidiarrheal medication.  She has not been using her fiber supplement regularly.  She remains on amitriptyline for chronic abdominal discomfort which has been helpful.  No blood in the stool.

## 2025-06-05 ENCOUNTER — OFFICE VISIT (OUTPATIENT)
Dept: URBAN - METROPOLITAN AREA CLINIC 113 | Facility: CLINIC | Age: 50
End: 2025-06-05